# Patient Record
Sex: FEMALE | Race: WHITE | Employment: OTHER | URBAN - METROPOLITAN AREA
[De-identification: names, ages, dates, MRNs, and addresses within clinical notes are randomized per-mention and may not be internally consistent; named-entity substitution may affect disease eponyms.]

---

## 2017-02-06 RX ORDER — SIMVASTATIN 20 MG
TABLET ORAL
Qty: 90 TABLET | Refills: 1 | Status: SHIPPED | OUTPATIENT
Start: 2017-02-06 | End: 2017-08-15

## 2017-06-19 ENCOUNTER — MED REC SCAN ONLY (OUTPATIENT)
Dept: FAMILY MEDICINE CLINIC | Facility: CLINIC | Age: 72
End: 2017-06-19

## 2017-07-20 ENCOUNTER — TELEPHONE (OUTPATIENT)
Dept: FAMILY MEDICINE CLINIC | Facility: CLINIC | Age: 72
End: 2017-07-20

## 2017-07-20 RX ORDER — AMLODIPINE BESYLATE AND BENAZEPRIL HYDROCHLORIDE 5; 20 MG/1; MG/1
1 CAPSULE ORAL DAILY
Refills: 4 | OUTPATIENT
Start: 2017-07-20

## 2017-07-26 ENCOUNTER — TELEPHONE (OUTPATIENT)
Dept: FAMILY MEDICINE CLINIC | Facility: CLINIC | Age: 72
End: 2017-07-26

## 2017-07-26 RX ORDER — AMLODIPINE BESYLATE AND BENAZEPRIL HYDROCHLORIDE 5; 20 MG/1; MG/1
1 CAPSULE ORAL DAILY
Qty: 30 CAPSULE | Refills: 0 | Status: SHIPPED | OUTPATIENT
Start: 2017-07-26 | End: 2017-08-15

## 2017-08-15 PROCEDURE — 85025 COMPLETE CBC W/AUTO DIFF WBC: CPT | Performed by: FAMILY MEDICINE

## 2017-08-15 PROCEDURE — 80053 COMPREHEN METABOLIC PANEL: CPT | Performed by: FAMILY MEDICINE

## 2017-08-15 PROCEDURE — 80061 LIPID PANEL: CPT | Performed by: FAMILY MEDICINE

## 2017-08-15 NOTE — PROGRESS NOTES
HPI:   Nevin Coon is a 67year old female who presents for a Medicare Subsequent Annual Wellness visit (Pt already had Initial Annual Wellness).       Her last annual assessment has been over 1 year: Annual Physical due on 03/01/1947         Patient C (two) times daily. ascorbic acid (VITAMIN C) 500 MG Oral Tab Take 500 mg by mouth daily. MEDICAL INFORMATION:   She  has a past medical history of Anxiety state, unspecified; Constipation (11/12/12); Depression; Depression (11/12/12);  Disuse osteopo Screening Method:  Finger Rub  Finger Rub Result:  Pass          Visual Acuity  Right Eye Visual Acuity: Uncorrected Right Eye Chart Acuity: 20/30   Left Eye Visual Acuity: Uncorrected Left Eye Chart Acuity: 20/30   Both Eyes Visual Acuity: Uncorrected B Krystin Last does not have a Living Will on file in [facility] Not Discussed       Healthcare Power of  on file in Epic:    Krystin Last does not have a Power of  for [employer] on file in [facility]  Not Discussed           PLAN:  The patien your day to day activities?: 0-No     Have you had any memory issues?: 1-Yes    Fall/Risk Scorin    Scoring Interpretation: 4+ At Risk     Depression Screening (PHQ-2/PHQ-9): Over the LAST 2 WEEKS   Little interest or pleasure in doing things (over the Glaucoma, AA>50, > 65 No flowsheet data found. Bone Density Screening      Dexascan Every two years No results found for this or any previous visit. No flowsheet data found.     Pap and Pelvic      Pap: Every 3 yrs age 21-65 or Pap+HPV every 5 yr POTASSIUM (P) (mmol/L)   Date Value   09/02/2016 3.6    No flowsheet data found. Creatinine  Annually Creatinine (mg/dL)   Date Value   02/16/2016 0.78     CREATININE (P) (mg/dL)   Date Value   09/02/2016 0.80    No flowsheet data found.     BUN  Carmie Dose

## 2017-08-15 NOTE — PATIENT INSTRUCTIONS
Kacie Pérez's SCREENING SCHEDULE   Tests on this list are recommended by your physician but may not be covered, or covered at this frequency, by your insurer. Please check with your insurance carrier before scheduling to verify coverage.    MARIA INES Update Health Maintenance if applicable    Flex Sigmoidoscopy Screen  Covered every 5 years No results found for this or any previous visit. No flowsheet data found.      Fecal Occult Blood   Covered Annually No results found for: FOB, OCCULTSTOOL No flowsh once after your 65th birthday    Hepatitis B for Moderate/High Risk       No orders found for this or any previous visit.  Medium/high risk factors:   End-stage renal disease   Hemophiliacs who received Factor VIII or IX concentrates   Clients of rian

## 2017-09-05 ENCOUNTER — TELEPHONE (OUTPATIENT)
Dept: FAMILY MEDICINE CLINIC | Facility: CLINIC | Age: 72
End: 2017-09-05

## 2017-09-05 NOTE — TELEPHONE ENCOUNTER
P/Dr. Philomena Wick patient to increase simvastatin to 40mg. Spoke with donny regarding increasing medication, she states that she was not taking the medication medication for awhile when we fay her bw.  She is going to restart the medication and bw will be draw

## 2017-09-06 ENCOUNTER — TELEPHONE (OUTPATIENT)
Dept: FAMILY MEDICINE CLINIC | Facility: CLINIC | Age: 72
End: 2017-09-06

## 2017-09-06 DIAGNOSIS — I10 ESSENTIAL HYPERTENSION WITH GOAL BLOOD PRESSURE LESS THAN 140/90: ICD-10-CM

## 2017-09-06 RX ORDER — AMLODIPINE BESYLATE AND BENAZEPRIL HYDROCHLORIDE 5; 20 MG/1; MG/1
1 CAPSULE ORAL DAILY
Qty: 90 CAPSULE | Refills: 1 | Status: SHIPPED | OUTPATIENT
Start: 2017-09-06 | End: 2018-01-01

## 2018-01-01 ENCOUNTER — APPOINTMENT (OUTPATIENT)
Dept: GENERAL RADIOLOGY | Facility: HOSPITAL | Age: 73
DRG: 208 | End: 2018-01-01
Payer: MEDICARE

## 2018-01-01 ENCOUNTER — HOSPITAL ENCOUNTER (INPATIENT)
Facility: HOSPITAL | Age: 73
LOS: 8 days | DRG: 178 | End: 2018-01-01
Attending: INTERNAL MEDICINE | Admitting: INTERNAL MEDICINE
Payer: OTHER MISCELLANEOUS

## 2018-01-01 ENCOUNTER — APPOINTMENT (OUTPATIENT)
Dept: GENERAL RADIOLOGY | Facility: HOSPITAL | Age: 73
DRG: 208 | End: 2018-01-01
Attending: EMERGENCY MEDICINE
Payer: MEDICARE

## 2018-01-01 ENCOUNTER — APPOINTMENT (OUTPATIENT)
Dept: GENERAL RADIOLOGY | Facility: HOSPITAL | Age: 73
DRG: 208 | End: 2018-01-01
Attending: CLINICAL NURSE SPECIALIST
Payer: MEDICARE

## 2018-01-01 ENCOUNTER — APPOINTMENT (OUTPATIENT)
Dept: GENERAL RADIOLOGY | Facility: HOSPITAL | Age: 73
DRG: 208 | End: 2018-01-01
Attending: INTERNAL MEDICINE
Payer: MEDICARE

## 2018-01-01 ENCOUNTER — HOSPITAL ENCOUNTER (INPATIENT)
Facility: HOSPITAL | Age: 73
LOS: 2 days | Discharge: HOSPICE | DRG: 208 | End: 2018-01-01
Attending: EMERGENCY MEDICINE | Admitting: INTERNAL MEDICINE
Payer: MEDICARE

## 2018-01-01 VITALS
HEART RATE: 76 BPM | DIASTOLIC BLOOD PRESSURE: 24 MMHG | TEMPERATURE: 98 F | OXYGEN SATURATION: 80 % | SYSTOLIC BLOOD PRESSURE: 40 MMHG | RESPIRATION RATE: 15 BRPM

## 2018-01-01 VITALS
WEIGHT: 147.69 LBS | RESPIRATION RATE: 38 BRPM | HEART RATE: 88 BPM | TEMPERATURE: 98 F | BODY MASS INDEX: 29 KG/M2 | OXYGEN SATURATION: 96 % | SYSTOLIC BLOOD PRESSURE: 97 MMHG | DIASTOLIC BLOOD PRESSURE: 75 MMHG

## 2018-01-01 DIAGNOSIS — I10 ESSENTIAL HYPERTENSION WITH GOAL BLOOD PRESSURE LESS THAN 140/90: ICD-10-CM

## 2018-01-01 DIAGNOSIS — Z00.00 ENCOUNTER FOR MEDICARE ANNUAL WELLNESS EXAM: ICD-10-CM

## 2018-01-01 DIAGNOSIS — J96.00 ACUTE RESPIRATORY FAILURE, UNSPECIFIED WHETHER WITH HYPOXIA OR HYPERCAPNIA (HCC): Primary | ICD-10-CM

## 2018-01-01 DIAGNOSIS — T17.908A ASPIRATION INTO RESPIRATORY TRACT, INITIAL ENCOUNTER: ICD-10-CM

## 2018-01-01 DIAGNOSIS — F32.89 OTHER DEPRESSION: ICD-10-CM

## 2018-01-01 DIAGNOSIS — E78.00 HYPERCHOLESTEROLEMIA: ICD-10-CM

## 2018-01-01 PROCEDURE — 99221 1ST HOSP IP/OBS SF/LOW 40: CPT | Performed by: REGISTERED NURSE

## 2018-01-01 PROCEDURE — 99232 SBSQ HOSP IP/OBS MODERATE 35: CPT | Performed by: HOSPITALIST

## 2018-01-01 PROCEDURE — 99497 ADVNCD CARE PLAN 30 MIN: CPT | Performed by: REGISTERED NURSE

## 2018-01-01 PROCEDURE — 99221 1ST HOSP IP/OBS SF/LOW 40: CPT | Performed by: INTERNAL MEDICINE

## 2018-01-01 PROCEDURE — 99232 SBSQ HOSP IP/OBS MODERATE 35: CPT | Performed by: INTERNAL MEDICINE

## 2018-01-01 PROCEDURE — 0BH17EZ INSERTION OF ENDOTRACHEAL AIRWAY INTO TRACHEA, VIA NATURAL OR ARTIFICIAL OPENING: ICD-10-PCS | Performed by: EMERGENCY MEDICINE

## 2018-01-01 PROCEDURE — 5A1945Z RESPIRATORY VENTILATION, 24-96 CONSECUTIVE HOURS: ICD-10-PCS | Performed by: EMERGENCY MEDICINE

## 2018-01-01 PROCEDURE — 99233 SBSQ HOSP IP/OBS HIGH 50: CPT | Performed by: HOSPITALIST

## 2018-01-01 PROCEDURE — 0CCM7ZZ EXTIRPATION OF MATTER FROM PHARYNX, VIA NATURAL OR ARTIFICIAL OPENING: ICD-10-PCS | Performed by: EMERGENCY MEDICINE

## 2018-01-01 PROCEDURE — 99233 SBSQ HOSP IP/OBS HIGH 50: CPT | Performed by: REGISTERED NURSE

## 2018-01-01 PROCEDURE — 71045 X-RAY EXAM CHEST 1 VIEW: CPT | Performed by: EMERGENCY MEDICINE

## 2018-01-01 PROCEDURE — 99233 SBSQ HOSP IP/OBS HIGH 50: CPT | Performed by: INTERNAL MEDICINE

## 2018-01-01 PROCEDURE — 71045 X-RAY EXAM CHEST 1 VIEW: CPT | Performed by: CLINICAL NURSE SPECIALIST

## 2018-01-01 PROCEDURE — 71045 X-RAY EXAM CHEST 1 VIEW: CPT | Performed by: INTERNAL MEDICINE

## 2018-01-01 PROCEDURE — 71045 X-RAY EXAM CHEST 1 VIEW: CPT

## 2018-01-01 PROCEDURE — 0BP1XDZ REMOVAL OF INTRALUMINAL DEVICE FROM TRACHEA, EXTERNAL APPROACH: ICD-10-PCS | Performed by: INTERNAL MEDICINE

## 2018-01-01 PROCEDURE — 99291 CRITICAL CARE FIRST HOUR: CPT | Performed by: INTERNAL MEDICINE

## 2018-01-01 RX ORDER — METOCLOPRAMIDE 10 MG/1
10 TABLET ORAL EVERY 6 HOURS PRN
Status: DISCONTINUED | OUTPATIENT
Start: 2018-01-01 | End: 2018-01-01

## 2018-01-01 RX ORDER — ACETAMINOPHEN 160 MG/5ML
650 SOLUTION ORAL EVERY 6 HOURS PRN
Status: DISCONTINUED | OUTPATIENT
Start: 2018-01-01 | End: 2018-01-01

## 2018-01-01 RX ORDER — SUCCINYLCHOLINE CHLORIDE 20 MG/ML
INJECTION INTRAMUSCULAR; INTRAVENOUS
Status: DISPENSED
Start: 2018-01-01 | End: 2018-01-01

## 2018-01-01 RX ORDER — GLYCOPYRROLATE 0.2 MG/ML
0.4 INJECTION, SOLUTION INTRAMUSCULAR; INTRAVENOUS
Status: DISCONTINUED | OUTPATIENT
Start: 2018-01-01 | End: 2018-01-01

## 2018-01-01 RX ORDER — SODIUM CHLORIDE 0.9 % (FLUSH) 0.9 %
3 SYRINGE (ML) INJECTION AS NEEDED
Status: DISCONTINUED | OUTPATIENT
Start: 2018-01-01 | End: 2018-01-01

## 2018-01-01 RX ORDER — SIMVASTATIN 20 MG
TABLET ORAL
Qty: 90 TABLET | Refills: 0 | OUTPATIENT
Start: 2018-01-01

## 2018-01-01 RX ORDER — SODIUM CHLORIDE 0.9 % (FLUSH) 0.9 %
10 SYRINGE (ML) INJECTION AS NEEDED
Status: DISCONTINUED | OUTPATIENT
Start: 2018-01-01 | End: 2018-01-01

## 2018-01-01 RX ORDER — SODIUM CHLORIDE 9 MG/ML
INJECTION, SOLUTION INTRAVENOUS CONTINUOUS
Status: DISCONTINUED | OUTPATIENT
Start: 2018-01-01 | End: 2018-01-01

## 2018-01-01 RX ORDER — 0.9 % SODIUM CHLORIDE 0.9 %
VIAL (ML) INJECTION
Status: COMPLETED
Start: 2018-01-01 | End: 2018-01-01

## 2018-01-01 RX ORDER — SODIUM CHLORIDE 9 MG/ML
30 INJECTION, SOLUTION INTRAVENOUS ONCE
Status: COMPLETED | OUTPATIENT
Start: 2018-01-01 | End: 2018-01-01

## 2018-01-01 RX ORDER — AMLODIPINE BESYLATE AND BENAZEPRIL HYDROCHLORIDE 5; 20 MG/1; MG/1
1 CAPSULE ORAL DAILY
Qty: 90 CAPSULE | Refills: 0 | Status: SHIPPED | OUTPATIENT
Start: 2018-01-01 | End: 2018-01-01

## 2018-01-01 RX ORDER — ETOMIDATE 2 MG/ML
INJECTION INTRAVENOUS
Status: DISPENSED
Start: 2018-01-01 | End: 2018-01-01

## 2018-01-01 RX ORDER — SIMVASTATIN 20 MG
20 TABLET ORAL
Qty: 90 TABLET | Refills: 0 | Status: SHIPPED | OUTPATIENT
Start: 2018-01-01 | End: 2018-01-01

## 2018-01-01 RX ORDER — ASPIRIN 81 MG/1
81 TABLET ORAL DAILY
Qty: 90 TABLET | Refills: 1 | OUTPATIENT
Start: 2018-01-01

## 2018-01-01 RX ORDER — HALOPERIDOL 5 MG/ML
2 INJECTION INTRAMUSCULAR
Status: DISCONTINUED | OUTPATIENT
Start: 2018-01-01 | End: 2018-01-01

## 2018-01-01 RX ORDER — SODIUM CHLORIDE 0.9 % (FLUSH) 0.9 %
3 SYRINGE (ML) INJECTION AS NEEDED
Status: CANCELLED | OUTPATIENT
Start: 2018-01-01

## 2018-01-01 RX ORDER — ROCURONIUM BROMIDE 10 MG/ML
INJECTION, SOLUTION INTRAVENOUS
Status: DISPENSED
Start: 2018-01-01 | End: 2018-01-01

## 2018-01-01 RX ORDER — POTASSIUM CHLORIDE 14.9 MG/ML
20 INJECTION INTRAVENOUS ONCE
Status: COMPLETED | OUTPATIENT
Start: 2018-01-01 | End: 2018-01-01

## 2018-01-01 RX ORDER — ASPIRIN 81 MG/1
81 TABLET ORAL DAILY
Qty: 90 TABLET | Refills: 1 | Status: SHIPPED | OUTPATIENT
Start: 2018-01-01 | End: 2018-01-01

## 2018-01-01 RX ORDER — METOCLOPRAMIDE HYDROCHLORIDE 5 MG/ML
10 INJECTION INTRAMUSCULAR; INTRAVENOUS EVERY 6 HOURS PRN
Status: DISCONTINUED | OUTPATIENT
Start: 2018-01-01 | End: 2018-01-01

## 2018-01-01 RX ORDER — GLYCOPYRROLATE 0.2 MG/ML
0.4 INJECTION, SOLUTION INTRAMUSCULAR; INTRAVENOUS
Status: CANCELLED | OUTPATIENT
Start: 2018-01-01

## 2018-01-01 RX ORDER — ETOMIDATE 2 MG/ML
20 INJECTION INTRAVENOUS AS NEEDED
Status: DISCONTINUED | OUTPATIENT
Start: 2018-01-01 | End: 2018-01-01

## 2018-01-01 RX ORDER — SODIUM CHLORIDE 9 MG/ML
INJECTION, SOLUTION INTRAVENOUS
Status: COMPLETED
Start: 2018-01-01 | End: 2018-01-01

## 2018-01-01 RX ORDER — ACETAMINOPHEN 650 MG/1
650 SUPPOSITORY RECTAL EVERY 6 HOURS PRN
Status: DISCONTINUED | OUTPATIENT
Start: 2018-01-01 | End: 2018-01-01

## 2018-01-01 RX ORDER — FUROSEMIDE 40 MG/1
40 TABLET ORAL EVERY 8 HOURS PRN
Status: DISCONTINUED | OUTPATIENT
Start: 2018-01-01 | End: 2018-01-01

## 2018-01-01 RX ORDER — BISACODYL 10 MG
10 SUPPOSITORY, RECTAL RECTAL
Status: DISCONTINUED | OUTPATIENT
Start: 2018-01-01 | End: 2018-01-01

## 2018-01-01 RX ORDER — FUROSEMIDE 10 MG/ML
40 INJECTION INTRAMUSCULAR; INTRAVENOUS EVERY 8 HOURS PRN
Status: DISCONTINUED | OUTPATIENT
Start: 2018-01-01 | End: 2018-01-01

## 2018-01-01 RX ORDER — HALOPERIDOL 5 MG/ML
1 INJECTION INTRAMUSCULAR
Status: DISCONTINUED | OUTPATIENT
Start: 2018-01-01 | End: 2018-01-01

## 2018-01-01 RX ORDER — LORAZEPAM 2 MG/ML
1 INJECTION INTRAMUSCULAR EVERY 4 HOURS PRN
Status: DISCONTINUED | OUTPATIENT
Start: 2018-01-01 | End: 2018-01-01

## 2018-01-01 RX ORDER — SCOLOPAMINE TRANSDERMAL SYSTEM 1 MG/1
1 PATCH, EXTENDED RELEASE TRANSDERMAL
Status: CANCELLED | OUTPATIENT
Start: 2018-01-01

## 2018-01-01 RX ORDER — MORPHINE SULFATE 2 MG/ML
2 INJECTION, SOLUTION INTRAMUSCULAR; INTRAVENOUS
Status: DISCONTINUED | OUTPATIENT
Start: 2018-01-01 | End: 2018-01-01

## 2018-01-01 RX ORDER — FLUOXETINE HYDROCHLORIDE 40 MG/1
40 CAPSULE ORAL
Qty: 90 CAPSULE | Refills: 1 | Status: ON HOLD | OUTPATIENT
Start: 2018-01-01 | End: 2018-01-01

## 2018-01-01 RX ORDER — LORAZEPAM 2 MG/ML
1 INJECTION INTRAMUSCULAR EVERY 4 HOURS PRN
Status: CANCELLED | OUTPATIENT
Start: 2018-01-01

## 2018-01-01 RX ORDER — ATROPINE SULFATE 10 MG/ML
2 SOLUTION/ DROPS OPHTHALMIC EVERY 2 HOUR PRN
Status: DISCONTINUED | OUTPATIENT
Start: 2018-01-01 | End: 2018-01-01

## 2018-01-01 RX ORDER — ONDANSETRON 4 MG/1
4 TABLET, ORALLY DISINTEGRATING ORAL EVERY 6 HOURS PRN
Status: DISCONTINUED | OUTPATIENT
Start: 2018-01-01 | End: 2018-01-01

## 2018-01-01 RX ORDER — SCOLOPAMINE TRANSDERMAL SYSTEM 1 MG/1
1 PATCH, EXTENDED RELEASE TRANSDERMAL
Status: DISCONTINUED | OUTPATIENT
Start: 2018-01-01 | End: 2018-01-01

## 2018-01-01 RX ORDER — DEXTROSE AND SODIUM CHLORIDE 5; .45 G/100ML; G/100ML
INJECTION, SOLUTION INTRAVENOUS CONTINUOUS
Status: DISCONTINUED | OUTPATIENT
Start: 2018-01-01 | End: 2018-01-01

## 2018-01-01 RX ORDER — MORPHINE SULFATE 2 MG/ML
1 INJECTION, SOLUTION INTRAMUSCULAR; INTRAVENOUS
Status: DISCONTINUED | OUTPATIENT
Start: 2018-01-01 | End: 2018-01-01

## 2018-01-01 RX ORDER — AMLODIPINE BESYLATE AND BENAZEPRIL HYDROCHLORIDE 5; 20 MG/1; MG/1
CAPSULE ORAL
Qty: 90 CAPSULE | Refills: 0 | OUTPATIENT
Start: 2018-01-01

## 2018-01-01 RX ORDER — ONDANSETRON 2 MG/ML
4 INJECTION INTRAMUSCULAR; INTRAVENOUS EVERY 6 HOURS PRN
Status: DISCONTINUED | OUTPATIENT
Start: 2018-01-01 | End: 2018-01-01

## 2018-11-05 PROBLEM — T17.908A ASPIRATION INTO RESPIRATORY TRACT, INITIAL ENCOUNTER: Status: ACTIVE | Noted: 2018-01-01

## 2018-11-05 PROBLEM — J96.00 ACUTE RESPIRATORY FAILURE, UNSPECIFIED WHETHER WITH HYPOXIA OR HYPERCAPNIA (HCC): Status: ACTIVE | Noted: 2018-01-01

## 2018-11-05 PROBLEM — J96.00 ACUTE RESPIRATORY FAILURE (HCC): Status: ACTIVE | Noted: 2018-01-01

## 2018-11-05 NOTE — ED INITIAL ASSESSMENT (HPI)
AT [address] CARE NURSE NOTED THE PATIENT WAS Maudie Shaggy, NOT BREATHING WELL, + AUDIBLE RALES, + VOMITING TO AROUND THE FACE AND TSHIRT

## 2018-11-05 NOTE — ED NOTES
Per EMS pt has been SOB for the past 2 months, pt has eyes open and in respiratory distress, pt has audible rales. Pt is non-verbal. Pt was intubated per Dr. Miramontes.

## 2018-11-06 PROBLEM — Z71.89 ADVANCE CARE PLANNING: Status: ACTIVE | Noted: 2018-01-01

## 2018-11-06 PROBLEM — Z71.89 GOALS OF CARE, COUNSELING/DISCUSSION: Status: ACTIVE | Noted: 2018-01-01

## 2018-11-06 NOTE — CONSULTS
[facility] Patient Status:  Inpatient    3/1/1945 MRN [id number]   Location [facility] 2W/SW Attending Severiano Yao MD   Hosp Day # 1 PCP Niraj Sanchez MD     Date of Consu just left the hospital. I attempted to reach pt's sister Yohannes Rad #[phone], no answer, LM to call back.  I discussed with RN that I would like to set up family meeting for tomorrow to discuss Denice Galloway as there has been discussion on compassionate wean possib Continuous  •  Piperacillin Sod-Tazobactam So (ZOSYN) 3.375 g in dextrose 5 % 100 mL ADD-vantage, 3.375 g, Intravenous, Q8H  •  propofol (DIPRIVAN) infusion, 5-100 mcg/kg/min, Intravenous, Continuous  •  0.9%  NaCl infusion, , Intravenous, Continuous  •  n 11/05/2018 at 15:44     Approved by (CST): Hunter Nix MD on 11/05/2018 at 15:46          Xr Chest Ap Portable  (cpt=71045)    Result Date: 11/5/2018  CONCLUSION:  1. Suboptimal inspiration. Mild bibasilar scarring/atelectasis.  No large airspac encounter    Hypernatremia    Dementia    H/o SDH    Dysphagia    Goals of care, counseling/discussion  -I attempted to reach sister Tilmon Bence via phone, LM, will need to arrange family meeting for Denice Galloway discussion and palliative options  -Pt is listed as DNR,

## 2018-11-06 NOTE — PROGRESS NOTES
[facility]    Progress Note      Assessment and Plan:   1. Acute respiratory failure with gross witnessed aspiration event. The patient has subsequently developed fever. The x-ray demonstrates bibasilar haziness now. Cultures are sent. hepatosplenomegaly and no mass appreciable. Extremities without clubbing cyanosis nor edema. Neurologic sedated on ventilator.      Results:     Lab Results   Component Value Date    WBC 9.2 11/06/2018    HGB 10.0 11/06/2018    HCT 31.0 11/06/2018    PL

## 2018-11-06 NOTE — PLAN OF CARE
Patient Centered Care    • Patient preferences are identified and integrated in the patient's plan of care Not Progressing        RESPIRATORY - ADULT    • Achieves optimal ventilation and oxygenation Not Progressing        Safety Risk - Non-Violent Isabella Diazt

## 2018-11-06 NOTE — H&P
[facility]    History & Physical    Date:  11/5/2018   Date of Admission:  11/5/2018      Chief Complaint:   Sangeeta Mendez is a(n) 68year old female with acute aspiration event and respiratory failure.     HPI:   The patient has a histor Single, no kids, no tobacco, no alcohol, office work in the past, has a twin Kostas Wood who is making decisions.   Social History    Tobacco Use      Smoking status: Never Smoker      Smokeless tobacco: Never Used    Alcohol use: No    Drug use: No    A Temporal, resp. rate (!) 28, weight 143 lb 4.8 oz (65 kg), SpO2 99 %. Sedate white female on ventilator  HEENT examination is unremarkable with pupils equal round and reactive to light and accommodation.    Neck without adenopathy, thyromegaly, JVD nor b Critical Care, [address] [place] [address]  Medical Director, [facility]  Pager: [phone]

## 2018-11-06 NOTE — CM/SW NOTE
11/6: Received MDO for Advanced Directives & POLST. Patient admitted to [facility] after choking incident, patient is not intubated & on a vent. Met with patient's twin sister Joycelyn Ely & her  Alonso Gamez.  Per Joycelyn Ely, patient lived in a house with a private ca

## 2018-11-07 PROBLEM — J69.0 ASPIRATION PNEUMONIA (HCC): Status: ACTIVE | Noted: 2018-01-01

## 2018-11-07 NOTE — HOSPICE RN NOTE
Hospice RN Dasia Garcia met with patient's sisters Blake Hand and Rex Del Real, as well as brother in law [facility]. Information for hospice provided and goals of care establish (comfort measures only).   Family was agreeable to hospice care and patient's sister Blake Hand sign

## 2018-11-07 NOTE — PROGRESS NOTES
[facility]    Progress Note      Assessment and Plan:   1. Acute respiratory failure with gross witnessed aspiration event. The patient has subsequently developed fever. The x-ray demonstrates bibasilar haziness now. Cultures are sent. appreciable. Extremities without clubbing cyanosis nor edema. Neurologic sedated on ventilator.      Results:     Lab Results   Component Value Date    WBC 7.9 11/07/2018    HGB 9.1 11/07/2018    HCT 27.3 11/07/2018    PLT 90 11/07/2018    CREATSERUM 0.

## 2018-11-07 NOTE — PLAN OF CARE
Problem: RESPIRATORY - ADULT  Goal: Achieves optimal ventilation and oxygenation  INTERVENTIONS:  - Assess for changes in respiratory status  - Assess for changes in mentation and behavior  - Position to facilitate oxygenation and minimize respiratory effo risk  Interventions:  - Patient should be alert and upright for all feedings (90 degrees preferred)  - Offer food and liquids at a slow rate  - No straws  - Encourage small bites of food and small sips of liquid  - Offer pills one at a time, crush or deliv

## 2018-11-07 NOTE — PLAN OF CARE
Patient will remain free from self-harm Completed    Pt did not strain against restraints, no harm noted. Restraints removed/discontinued prior to planned extubation. Minimize aspiration risk Progressing    Keeping pt NPO, pt lethargic.    Patient/Family S

## 2018-11-08 NOTE — PLAN OF CARE
PAIN - ADULT    • Verbalizes/displays adequate comfort level or patient's stated pain goal Progressing        Patient Centered Care    • Patient preferences are identified and integrated in the patient's plan of care [id number]

## 2018-11-08 NOTE — SPIRITUAL CARE NOTE
Patient was admitted to Hospice. Pt.is non verbal, made an eye contact but couldn't communicate.  No family member was present at the time of the visit

## 2018-11-08 NOTE — PLAN OF CARE
PAIN - ADULT    • Verbalizes/displays adequate comfort level or patient's stated pain goal Progressing        SAFETY ADULT - FALL    • Free from fall injury Progressing          Pt appears to be resting comfortably throughout night.  Morphine drip maintaine

## 2018-11-08 NOTE — DISCHARGE SUMMARY
Discharge Summary    Date of Admission: 11/5/2018    Date of Discharge: 11/8/2018    Hospital Course: The patient was admitted to the hospital with respiratory arrest after a choking episode. She received antibiotics for aspiration pneumonitis.   She di

## 2018-11-08 NOTE — CM/SW NOTE
DULCE jalloh. The pt appeared calm, comfortable. Was sleeping at time of visit. Family at home today, as per RN. MSW left contact info, checked in with FN, provided supportive presence.   DULCE Zaman  [facility]  [phone]

## 2018-11-08 NOTE — H&P
[facility]    History & Physical    Date:  11/7/2018   Date of Admission:  11/7/2018      Chief Complaint:   Deandra Boles is a(n) 68year old female with dysphagia status post aspiration event with respiratory arrest.    HPI:   The ekta adenopathy, thyromegaly, JVD nor bruit. Lungs clear to auscultation and percussion. Cardiac regular rate and rhythm no murmur. Abdomen nontender, without hepatosplenomegaly and no mass appreciable. Extremities without clubbing cyanosis nor edema.

## 2018-11-08 NOTE — PROGRESS NOTES
Pt transferred by bed to room 454, continues on NC 3l, pt very drowsy, no change from previous. Report called earlier to YUDITH Hartman. Update now.

## 2018-11-08 NOTE — HOSPICE RN NOTE
Patient is currently GIP Day 2. She is currently receiving Morphine at 1mg/hr. At time of hospice RN assessment the patient exhibited dyspnea as evidence by respirations of 21/min with use of abdominal/accessory muscles.   Patient denied pain at this time

## 2018-11-09 NOTE — PROGRESS NOTES
[facility]     Progress Note        Nat Valencia Patient Status:  Inpatient    3/1/1945 MRN [id number]   Location [facility] 4W/SW/SE Attending Barbara Perez MD   Hosp Day # 2 PCP Brooke Hayes MD       Subjective:   Blaine Schwab Q6H PRN   Or      ondansetron HCl (ZOFRAN) injection 4 mg 4 mg Intravenous Q6H PRN   Metoclopramide HCl (REGLAN) tab 10 mg 10 mg Oral Q6H PRN   Or      Metoclopramide HCl (REGLAN) injection 10 mg 10 mg Intravenous Q6H PRN       Continuous Infusions:   • mo

## 2018-11-09 NOTE — PROGRESS NOTES
[facility]    Progress Note    Ac Sport Patient Status:  Inpatient    3/1/1945 MRN [id number]   Location [facility] 4W/SW/SE Attending Dory Garcia MD   Hosp Day # 2 PCP Claude Hoover MD       Subjective:   M[facility] Brig[facility] inpatient services that will reasonably be expected to span two midnight's based on the clinical documentation in H+P. Based on patients current state of illness, I anticipate that, after discharge, patient will require TBD.

## 2018-11-09 NOTE — PLAN OF CARE
DEATH & DYING    • Pt/Family communicate acceptance of impending death and feel psychological comfort and peace Progressing        PAIN - ADULT    • Verbalizes/displays adequate comfort level or patient's stated pain goal Progressing        Patient Ella Wesley

## 2018-11-09 NOTE — ED PROVIDER NOTES
Patient Seen in: [facility] 2w/sw    History   Patient presents with:  Dyspnea DERRICK SOB (respiratory)    Stated Complaint: AMS, POSSIBLE ASPIRATION    HPI    Patient is a 68-year-old female who presents to the emergency department acutely short of Resp (!) 38   Temp 98.2 °F (36.8 °C)   Temp src Temporal   SpO2 (!) 75 %   O2 Device None (Room air)       Current:BP 97/75 (BP Location: Right arm)   Pulse 88   Temp 97.8 °F (36.6 °C) (Temporal)   Resp (!) 38   Wt 67 kg   SpO2 96%   BMI 28.85 kg/m² Bacteria Urine Moderate (*)     All other components within normal limits   COMP METABOLIC PANEL (14) - Abnormal; Notable for the following components:    Glucose 120 (*)     Sodium 153 (*)     Chloride 120 (*)     BUN 31 (*)     Calcium, Total 8.4 (*) Normal    Narrative:     Results Repeated   MRSA SCREEN BY PCR - Normal   CBC WITH DIFFERENTIAL WITH PLATELET    Narrative: The following orders were created for panel order CBC WITH DIFFERENTIAL WITH PLATELET.   Procedure instability due to her likely aspiration. This involved direct patient intervention, complex decision making, and/or extensive discussions with the patient, family, and clinical staff.      Patient intubated with 7.5 endotracheal tube, rapid sequence intub

## 2018-11-09 NOTE — HOSPICE RN NOTE
Patient is currently GIP Day 3. Patient appeared uncomfortable during visit, as well as dyspnec with use of accessory muscles. Audible congestion noted. Patient continues to open her eyes to verbal stimuli and can answer some yes/no questions.   Hospital

## 2018-11-09 NOTE — PLAN OF CARE
PAIN - ADULT    • Verbalizes/displays adequate comfort level or patient's stated pain goal Progressing        Patient Centered Care    • Patient preferences are identified and integrated in the patient's plan of care [id number]

## 2018-11-10 NOTE — PLAN OF CARE
DEATH & DYING    • Pt/Family communicate acceptance of impending death and feel psychological comfort and peace Progressing        PAIN - ADULT    • Verbalizes/displays adequate comfort level or patient's stated pain goal Progressing        Patient Sofie Vasquez

## 2018-11-10 NOTE — HOSPICE RN NOTE
GIP DAY 4  At this time the patient is not responsive during my visit  Morphine drip is at 4mg/hour to manage her dyspnea/pain  NPO  Oxygen 2 liters nasal cannula  Lung congestion is being managed with IV Robinul  Mccarthy draining vishal urine  Patient remain

## 2018-11-10 NOTE — PROGRESS NOTES
[facility]    Progress Note    Julio C Jimenez Patient Status:  Inpatient    3/1/1945 MRN [id number]   Location [facility] 4W/SW/SE Attending Sofia Cevallos MD   Hosp Day # 3 PCP Ivett Marques MD       Subjective:   Baldemar Krabbe

## 2018-11-11 NOTE — PROGRESS NOTES
[place] [facility]    Progress Note    Deandra Boles Patient Status:  Inpatient    3/1/1945 MRN [id number]   Location [facility] 4W/SW/SE Attending Mervin Banuelos MD   Hosp Day # 4 PCP Trang Adhikari MD       Subjective:   Tanmay Cotter

## 2018-11-11 NOTE — PLAN OF CARE
DEATH & DYING    • Pt/Family communicate acceptance of impending death and feel psychological comfort and peace Progressing        PAIN - ADULT    • Verbalizes/displays adequate comfort level or patient's stated pain goal Progressing        Patient Melissa Quiroga

## 2018-11-11 NOTE — HOSPICE RN NOTE
GIP DAY 5  Morphine drip is at 5mg/hour for her dyspnea/pain  NPO  Oxygen is at 2 liters nasal cannula  Congestion is being managed with IV Narendrainjosé miguel  Patient remains inpatient appropriate at this time  Family at bedside End of Life discussion with patients

## 2018-11-12 NOTE — PLAN OF CARE
DEATH & DYING    • Pt/Family communicate acceptance of impending death and feel psychological comfort and peace Progressing        PAIN - ADULT    • Verbalizes/displays adequate comfort level or patient's stated pain goal Progressing        Patient Conner Laughter

## 2018-11-12 NOTE — PLAN OF CARE
DEATH & DYING    • Pt/Family communicate acceptance of impending death and feel psychological comfort and peace Progressing        PAIN - ADULT    • Verbalizes/displays adequate comfort level or patient's stated pain goal Progressing        Patient General Medin

## 2018-11-12 NOTE — HOSPICE RN NOTE
Patient is currently GIP Day 6. At this time patient appears imminent, but comfortable. She is minimally responsive with shallow respirations, as well as audible congestion.   Patient received IV Lasix and Robinul during hospice RN assessment, meds admini

## 2018-11-12 NOTE — CM/SW NOTE
MSW routine visit 11/12/18. The pt appeared clam, comfortable with no s/s of pain or distress. As per FN her pain has been managed, no changes to report. MSW provided supportive presence.   DULCE Lara  [facility]  [phone]

## 2018-11-12 NOTE — PROGRESS NOTES
[facility]    Progress Note    Joie Blank Patient Status:  Inpatient    3/1/1945 MRN [id number]   Location [facility] 4W/SW/SE Attending Sánchez Bernstein MD   Hosp Day # 5 PCP Ernestina Garcia MD       Subjective:   Jayde Hager

## 2018-11-13 NOTE — PROGRESS NOTES
[facility]    Progress Note    Deandra Boles Patient Status:  Inpatient    3/1/1945 MRN [id number]   Location [facility] 4W/SW/SE Attending Mervin Banuelos MD   Hosp Day # 6 PCP Trang Adhikari MD       Subjective:   Tanmay Cotter

## 2018-11-13 NOTE — PLAN OF CARE
DEATH & DYING    • Pt/Family communicate acceptance of impending death and feel psychological comfort and peace Progressing        PAIN - ADULT    • Verbalizes/displays adequate comfort level or patient's stated pain goal Progressing        Patient Guerline Disla

## 2018-11-14 NOTE — PLAN OF CARE
DEATH & DYING    • Pt/Family communicate acceptance of impending death and feel psychological comfort and peace Progressing        PAIN - ADULT    • Verbalizes/displays adequate comfort level or patient's stated pain goal Progressing        Patient Daniel Cleveland

## 2018-11-14 NOTE — HOSPICE RN NOTE
GIP DAY 7  Patient having periods of apnea  facial flushing  Morphine drip is at 8mg/hour to manage her dyspnea /pain  NPO  Lung sounds are congested and managed with IV Robinul and IV Lasix  Oxygen 2 liters nasal cannula  POC was discussed with Jose Armando Gamez RN

## 2018-11-14 NOTE — PROGRESS NOTES
[facility]    Progress Note    Jolly Trevino Patient Status:  Inpatient    3/1/1945 MRN [id number]   Location [facility] 4W/SW/SE Attending Vasile Washington MD   Hosp Day # 7 PCP Ba Cortez MD       Subjective:   Tyrese Maddox

## 2018-11-15 NOTE — PLAN OF CARE
DEATH & DYING    • Pt/Family communicate acceptance of impending death and feel psychological comfort and peace Progressing        PAIN - ADULT    • Verbalizes/displays adequate comfort level or patient's stated pain goal Progressing        Patient Hema Sterling

## 2018-11-15 NOTE — PROGRESS NOTES
[facility]    Progress Note    Joie Blank Patient Status:  Inpatient    3/1/1945 MRN [id number]   Location [facility] 4W/SW/SE Attending Sánchez Bernstein MD   Hosp Day # 8 PCP Ernestina Garcia MD       Subjective:   Jayde Hager

## 2018-11-15 NOTE — PLAN OF CARE
DEATH & DYING    • Pt/Family communicate acceptance of impending death and feel psychological comfort and peace Progressing        PAIN - ADULT    • Verbalizes/displays adequate comfort level or patient's stated pain goal Progressing        Patient Bryson Malcolm

## 2018-11-15 NOTE — PLAN OF CARE
DEATH & DYING    • Pt/Family communicate acceptance of impending death and feel psychological comfort and peace Progressing        PAIN - ADULT    • Verbalizes/displays adequate comfort level or patient's stated pain goal Progressing        Patient Zay Josue

## 2018-11-15 NOTE — HOSPICE RN NOTE
GIP DAY 8  Morphine drip is at 10mg/hour to manage her dyspnea and pain  NPO  Oxygen 2 liters nasal cannula  Lung sounds congested and this is being managed with IVP Lasix and IVP Alberto alanis with dark urine in the bag  POC was discussed with Otto Smyth

## 2018-12-06 NOTE — DISCHARGE SUMMARY
ED to Hosp-Admission  Discharged      11/5/2018 - 11/7/2018 (2 days)  Rebecca Mendez MD   Last attending • Treatment team   Acute respiratory failure, unspecified whether with hypoxia or hypercapnia [facility])   Principal problem    Magdalena Barone Scan on 11/5/2018 4:21 PM by Sarah Dewitt on 11/5/2018 4:21 PM by Rosa Isela Fofana    Scan on 11/5/2018 4:20 PM by Sarah Dewitt on 11/5/2018 4:20 PM by Rosa Isela Fofana    Scan on 11/5/2018 4:22 PM by Rosa Isela Fofana: ems run reportScan on 11/5/2018

## 2019-01-25 NOTE — DISCHARGE SUMMARY
[facility]    Discharge Summary    Nevin Coon Patient Status:  Inpatient    3/1/1945 MRN [id number]   Location [facility] 4W/SW/SE Attending No att. providers found   Hosp Day # 8 PCP Karen Altamirano MD     Date of Admission passed in inpatient hospice with comfort care. Discharge Condition:     Discharge Medications:      Discharge Medications      You have not been prescribed any medications.              Medina Smyth  2019  1:29 PM    Greater than 30 minute

## 2021-02-27 NOTE — HOSPICE RN NOTE
GIP DAY 6  Morphine drip was increased to 6mg/hour for her dyspnea  New IV started today to right forearm  NPO  Oxygen 2 liters nasal cannula  Very congested   This is being treated with IV Robinul,Lasix and Scopolamine  POC was discussed with Anny Callejas RN  Pa 99

## 2022-01-31 NOTE — CONSULTS
[place] [facility]  Palliative Care Follow Up    Daquan Coats Patient Status:  Inpatient    3/1/1945 MRN [id number]   Location [facility] 2W/SW Attending Chantelle Ba MD   Hosp Day # 2 PCP Silvia Champagne MD     Date of Consult: 6 medications utilized to maintain comfort during the process. I discussed if she decompensates post-extubation the option for inpt hospice care.  Family is interested in moving in comfort direction and are interested in meeting with [facility] for i Chemistry:  Lab Results   Component Value Date    CREATSERUM 0.99 11/07/2018    BUN 19 11/07/2018     11/07/2018    K 3.1 (L) 11/07/2018    K 3.1 (L) 11/07/2018     (H) 11/07/2018    CO2 20 (L) 11/07/2018     (H) 11/07/2018    CA 7 at 15:17            Objective:  Vital Signs:  Blood pressure 117/65, pulse 77, temperature 98.8 °F (37.1 °C), temperature source Temporal, resp. rate (!) 30, weight 147 lb 11.3 oz (67 kg), SpO2 100 %. Body mass index is 28.85 kg/m².   Present Level of pain plans for reintubation  -Family prefers no bipap and comfort focused care if pt decompensates  -Morphine 2mg IVP Q 15 mins prn dyspnea for comfort if develops resp distress post-extubation    Aspiration into respiratory tract, initial encounter    Hypernat coordinating care. Discussed today's visit with Dr. Yaquelin Sanchez RN from [facility] and [place] [id number]    I will continue to follow clinically.     [facility] [id number]  11/7/2018  11:17  AM 89

## (undated) NOTE — LETTER
Hospital Discharge Documentation  Pt  at [facility] this admission under [facility] [address] [place] [address]. No discharge summary available at this time, below is the most recent progress note  for your review .         From: Estela Neely hourly, increased scopalamine patch to two patch.   Lasix and robinol given prn        Remains inpatient appropriate        Greater than 35 minutes spent, >50% spent counseling re: treatment plan and work up.